# Patient Record
Sex: MALE | Race: WHITE
[De-identification: names, ages, dates, MRNs, and addresses within clinical notes are randomized per-mention and may not be internally consistent; named-entity substitution may affect disease eponyms.]

---

## 2019-09-10 ENCOUNTER — HOSPITAL ENCOUNTER (INPATIENT)
Dept: HOSPITAL 95 - SURS | Age: 74
LOS: 3 days | Discharge: HOME | DRG: 470 | End: 2019-09-13
Attending: ORTHOPAEDIC SURGERY | Admitting: ORTHOPAEDIC SURGERY
Payer: MEDICARE

## 2019-09-10 VITALS — WEIGHT: 216.93 LBS | BODY MASS INDEX: 31.41 KG/M2 | HEIGHT: 69.69 IN

## 2019-09-10 DIAGNOSIS — M16.12: Primary | ICD-10-CM

## 2019-09-10 DIAGNOSIS — I10: ICD-10-CM

## 2019-09-10 PROCEDURE — C1776 JOINT DEVICE (IMPLANTABLE): HCPCS

## 2019-09-12 PROCEDURE — 0SRB04A REPLACEMENT OF LEFT HIP JOINT WITH CERAMIC ON POLYETHYLENE SYNTHETIC SUBSTITUTE, UNCEMENTED, OPEN APPROACH: ICD-10-PCS | Performed by: ORTHOPAEDIC SURGERY

## 2019-09-12 NOTE — NUR
ASSUMED CARE OF PATIENT AT THIS TIME.
PATIENT SLEEPING. RESP E/U. BIOX 95% ON RA. VSS. CONT TO MONITOR.

## 2019-09-12 NOTE — NUR
SHIFT SUMMARY
PATIENT AWAKENED BY PT THIS AFTERNOON; UP TO WALK IN COMBS.
PATIENT DENIES NEED FOR PAIN MED AT THIS TIME; APAP AND TORADOL ADMIN.
CIRC CHECKS TO LLE WNL. DRESSING D&I. TAKING PO. NO C/O AT THIS TIME.

## 2019-09-12 NOTE — NUR
Patient confirms NPO status and agrees with scheduled surgery.
History, Chart, Medications and Allergies reviewed before start of
procedure.
Patient reports completing Chlorhexadine shower X2 prior to admission to
hospital.Lungs clear T/O to Auscultation.
Surgical site prepped with 2% Chlorhexidine cloth wipe.

## 2019-09-13 LAB
ANION GAP SERPL CALCULATED.4IONS-SCNC: 8 MMOL/L (ref 6–16)
BASOPHILS # BLD AUTO: 0.01 K/MM3 (ref 0–0.23)
BASOPHILS NFR BLD AUTO: 0 % (ref 0–2)
BUN SERPL-MCNC: 28 MG/DL (ref 8–24)
CALCIUM SERPL-MCNC: 9.7 MG/DL (ref 8.5–10.1)
CHLORIDE SERPL-SCNC: 101 MMOL/L (ref 98–108)
CO2 SERPL-SCNC: 30 MMOL/L (ref 21–32)
CREAT SERPL-MCNC: 1.24 MG/DL (ref 0.6–1.2)
DEPRECATED RDW RBC AUTO: 47.9 FL (ref 35.1–46.3)
EOSINOPHIL # BLD AUTO: 0.01 K/MM3 (ref 0–0.68)
EOSINOPHIL NFR BLD AUTO: 0 % (ref 0–6)
ERYTHROCYTE [DISTWIDTH] IN BLOOD BY AUTOMATED COUNT: 12.5 % (ref 11.7–14.2)
GLUCOSE SERPL-MCNC: 131 MG/DL (ref 70–99)
HCT VFR BLD AUTO: 42.5 % (ref 37–53)
HGB BLD-MCNC: 14.4 G/DL (ref 13.5–17.5)
IMM GRANULOCYTES # BLD AUTO: 0.09 K/MM3 (ref 0–0.1)
IMM GRANULOCYTES NFR BLD AUTO: 1 % (ref 0–1)
LYMPHOCYTES # BLD AUTO: 1.65 K/MM3 (ref 0.84–5.2)
LYMPHOCYTES NFR BLD AUTO: 10 % (ref 21–46)
MCHC RBC AUTO-ENTMCNC: 33.9 G/DL (ref 31.5–36.5)
MCV RBC AUTO: 104 FL (ref 80–100)
MONOCYTES # BLD AUTO: 1.14 K/MM3 (ref 0.16–1.47)
MONOCYTES NFR BLD AUTO: 7 % (ref 4–13)
NEUTROPHILS # BLD AUTO: 13.77 K/MM3 (ref 1.96–9.15)
NEUTROPHILS NFR BLD AUTO: 83 % (ref 41–73)
NRBC # BLD AUTO: 0 K/MM3 (ref 0–0.02)
NRBC BLD AUTO-RTO: 0 /100 WBC (ref 0–0.2)
PLATELET # BLD AUTO: 227 K/MM3 (ref 150–400)
POTASSIUM SERPL-SCNC: 4.2 MMOL/L (ref 3.5–5.5)
SODIUM SERPL-SCNC: 139 MMOL/L (ref 136–145)

## 2019-09-13 NOTE — NUR
PATIENT D/C'D HOME WITH WIFE AT THIS TIME;
BOTH STATE UNDERSTANDING OF MEDS, WOUND CARE, ACTIVITY, OP PT, F/U APPT, ETC.
PATIENT STATES PAIN WELL CONTROLLED. TAKING PO. VOIDING. NO C/O AT THIS TIME.

## 2019-09-13 NOTE — NUR
POD 1 S/P L IVANNA. PT VSS T/O NIGHT. DRESSING CDI, PAIN MGD PER EMAR W/REP
RELIEF. PT SYDNIE REG PO, NO C/O N/V, IS VOIDING URINE W/O DIFFICULTY. IV SL
PER ORDERS. PT AMB W/FWW+ABS, SYDNIE WELL. PT IS USING CALL LIGHT FOR ASSISTANCE.
REP GIVEN TO DAY RN.